# Patient Record
Sex: MALE | Race: WHITE
[De-identification: names, ages, dates, MRNs, and addresses within clinical notes are randomized per-mention and may not be internally consistent; named-entity substitution may affect disease eponyms.]

---

## 2019-04-04 ENCOUNTER — APPOINTMENT (OUTPATIENT)
Dept: INTERNAL MEDICINE | Facility: CLINIC | Age: 19
End: 2019-04-04
Payer: COMMERCIAL

## 2019-04-04 VITALS
TEMPERATURE: 98.8 F | SYSTOLIC BLOOD PRESSURE: 124 MMHG | WEIGHT: 181 LBS | BODY MASS INDEX: 30.16 KG/M2 | HEIGHT: 65 IN | DIASTOLIC BLOOD PRESSURE: 70 MMHG | HEART RATE: 72 BPM

## 2019-04-04 DIAGNOSIS — Z00.00 ENCOUNTER FOR GENERAL ADULT MEDICAL EXAMINATION W/OUT ABNORMAL FINDINGS: ICD-10-CM

## 2019-04-04 DIAGNOSIS — Z78.9 OTHER SPECIFIED HEALTH STATUS: ICD-10-CM

## 2019-04-04 PROCEDURE — 99385 PREV VISIT NEW AGE 18-39: CPT | Mod: 25

## 2019-04-04 PROCEDURE — 36415 COLL VENOUS BLD VENIPUNCTURE: CPT

## 2019-04-04 NOTE — HEALTH RISK ASSESSMENT
[Very Good] : ~his/her~  mood as very good [0] : 2) Feeling down, depressed, or hopeless: Not at all (0) [HIV test declined] : HIV test declined [With Family] : lives with family [Student] : student [College] : College [Single] : single [Feels Safe at Home] : Feels safe at home [] : No [[id number]] : 0 [Sexually Active] : not sexually active [High Risk Behavior] : no high risk behavior

## 2019-04-08 LAB
25(OH)D3 SERPL-MCNC: 17.6 NG/ML
ALBUMIN SERPL ELPH-MCNC: 5.2 G/DL
ALP BLD-CCNC: 73 U/L
ALT SERPL-CCNC: 24 U/L
ANION GAP SERPL CALC-SCNC: 16 MMOL/L
APPEARANCE: CLEAR
AST SERPL-CCNC: 19 U/L
BASOPHILS # BLD AUTO: 0.04 K/UL
BASOPHILS NFR BLD AUTO: 0.8 %
BILIRUB SERPL-MCNC: 0.6 MG/DL
BILIRUBIN URINE: NEGATIVE
BLOOD URINE: NEGATIVE
BUN SERPL-MCNC: 16 MG/DL
CALCIUM SERPL-MCNC: 10 MG/DL
CHLORIDE SERPL-SCNC: 103 MMOL/L
CHOLEST SERPL-MCNC: 175 MG/DL
CHOLEST/HDLC SERPL: 3.7 RATIO
CO2 SERPL-SCNC: 22 MMOL/L
COLOR: YELLOW
CREAT SERPL-MCNC: 0.83 MG/DL
EOSINOPHIL # BLD AUTO: 0.12 K/UL
EOSINOPHIL NFR BLD AUTO: 2.3 %
GLUCOSE QUALITATIVE U: NEGATIVE
GLUCOSE SERPL-MCNC: 98 MG/DL
HBV SURFACE AB SER QL: NONREACTIVE
HCT VFR BLD CALC: 50 %
HDLC SERPL-MCNC: 48 MG/DL
HGB BLD-MCNC: 15.2 G/DL
IMM GRANULOCYTES NFR BLD AUTO: 0.2 %
KETONES URINE: NEGATIVE
LDLC SERPL CALC-MCNC: 110 MG/DL
LEUKOCYTE ESTERASE URINE: NEGATIVE
LYMPHOCYTES # BLD AUTO: 2.03 K/UL
LYMPHOCYTES NFR BLD AUTO: 39.1 %
MAN DIFF?: NORMAL
MCHC RBC-ENTMCNC: 26 PG
MCHC RBC-ENTMCNC: 30.4 GM/DL
MCV RBC AUTO: 85.6 FL
MONOCYTES # BLD AUTO: 0.3 K/UL
MONOCYTES NFR BLD AUTO: 5.8 %
NEUTROPHILS # BLD AUTO: 2.69 K/UL
NEUTROPHILS NFR BLD AUTO: 51.8 %
NITRITE URINE: NEGATIVE
PH URINE: 6
PLATELET # BLD AUTO: 289 K/UL
POTASSIUM SERPL-SCNC: 4.1 MMOL/L
PROT SERPL-MCNC: 7.7 G/DL
PROTEIN URINE: NORMAL
RBC # BLD: 5.84 M/UL
RBC # FLD: 14.4 %
SODIUM SERPL-SCNC: 141 MMOL/L
SPECIFIC GRAVITY URINE: 1.03
T4 FREE SERPL-MCNC: 1.3 NG/DL
TRIGL SERPL-MCNC: 83 MG/DL
TSH SERPL-ACNC: 1.01 UIU/ML
UROBILINOGEN URINE: NORMAL
WBC # FLD AUTO: 5.19 K/UL

## 2019-04-16 ENCOUNTER — APPOINTMENT (OUTPATIENT)
Dept: PODIATRY | Facility: CLINIC | Age: 19
End: 2019-04-16
Payer: COMMERCIAL

## 2019-04-16 VITALS
WEIGHT: 181 LBS | DIASTOLIC BLOOD PRESSURE: 80 MMHG | SYSTOLIC BLOOD PRESSURE: 110 MMHG | HEIGHT: 65 IN | BODY MASS INDEX: 30.16 KG/M2

## 2019-04-16 PROCEDURE — 99203 OFFICE O/P NEW LOW 30 MIN: CPT | Mod: 25

## 2019-04-16 PROCEDURE — 17110 DESTRUCTION B9 LES UP TO 14: CPT

## 2019-04-16 NOTE — PROCEDURE
[FreeTextEntry1] : Plantar wart \par          Clinical Notes: I had a lengthy discussion with the patient today regarding the diagnosis, etiology, differential diagnosis and treatment options of the presenting problem. Risks alternatives and benefits of each treatment which ranged from conservative to surgical were explained in great detail. I also explained the progression of treatment from conservative to surgical and the benefits of each. I stressed conservative treatment (and explained them again) until it no longer provides relief. OTC products and meds discussed and reviewed. All questions asked and answered appropriately to the patient's satisfaction. , I had a lengthy discussion with the patient today regarding the diagnosis, etiology, differential diagnosis and treatment options of the presenting problem. Risks alternatives and benefits of each treatment which ranged from conservative to surgical were explained in great detail. I also explained the progression of treatment from conservative to surgical and the benefits of each. I stressed conservative treatment (and explained them again) until it no longer provides relief. OTC products and meds discussed and reviewed. All questions asked and answered appropriately to the patient's satisfaction., A lengthy informative and educational discussion discussed with the patient. Various treatment options were reviewed. I discussed simply shaving the lesion and applied Cantharone on a biweekly basis, discussed complete excision, another treatment option discussed with treatment the cryotherapy.\par  Cryotherapy as well as risks and benefits were reviewed with the patient and all questions asked and answered and I also reviewed hyfrecation of the lesion. Risks benefits and possible recurrence was stressed with the patient. After reviewing all types of procedures as well as the aftercare involved, the patient opted for Cryotherapy. \par   Procedures: \par        Utilizing the appropriate tip, and using an alternating freeze, law, freeze technique in a circular manner with the probe advancing and distracting from the lesion complete cryotherapy was performed. The patient tolerated the procedure well discharge instructions reviewed expectations also reviewed and the treatment completed successfully the patient left the office in stable and satisfactory condition. \par \par

## 2019-04-16 NOTE — HISTORY OF PRESENT ILLNESS
[FreeTextEntry1] : Location: plantar aspect of right heel\par Duration: 2 months\par Acute:yes\par Chronic: no\par Past Tx: nothing\par Exacerbated by standing and walking:\par

## 2019-04-16 NOTE — PHYSICAL EXAM
[General Appearance - Alert] : alert [General Appearance - In No Acute Distress] : in no acute distress [Abnormal Walk] : normal gait [Nail Clubbing] : no clubbing  or cyanosis of the fingernails [Musculoskeletal - Swelling] : no joint swelling seen [Motor Tone] : muscle strength and tone were normal [Oriented To Time, Place, And Person] : oriented to person, place, and time [Impaired Insight] : insight and judgment were intact [Affect] : the affect was normal [FreeTextEntry1] : The neurologic exam reveals gross sensorium to be intact, no sign of numbness tingling or sharp shooting pain. No evidence of neuropathy, or other positive neurologic findings

## 2019-04-16 NOTE — REVIEW OF SYSTEMS
[As Noted in HPI] : as noted in HPI [Skin Lesions] : skin lesion [An Unusual Growth] : an unusual growth on the skin [Negative] : Psychiatric [Skin Wound] : no skin wound [Itching] : no itching [Change In A Mole] : no change in a mole [Dry Skin] : no dry skin

## 2019-04-30 ENCOUNTER — APPOINTMENT (OUTPATIENT)
Dept: PODIATRY | Facility: CLINIC | Age: 19
End: 2019-04-30
Payer: COMMERCIAL

## 2019-04-30 VITALS
BODY MASS INDEX: 30.16 KG/M2 | DIASTOLIC BLOOD PRESSURE: 60 MMHG | SYSTOLIC BLOOD PRESSURE: 100 MMHG | WEIGHT: 181 LBS | HEIGHT: 65 IN

## 2019-04-30 PROCEDURE — 17110 DESTRUCTION B9 LES UP TO 14: CPT

## 2019-04-30 NOTE — PHYSICAL EXAM
[Full Pulse] : the pedal pulses are present [Edema] : there was no peripheral edema [FreeTextEntry1] : visual exam of the area exhibits interruption of skin lines and capillary bleeding, upon debridemment, and classic characteristics seen in a verruca, cauliflower-like appearance, pain on side to side compression\par

## 2019-04-30 NOTE — HISTORY OF PRESENT ILLNESS
[FreeTextEntry1] : Location: plantar aspect of right heel\par Duration: 2 months\par Past Tx: cryoprobe\par \par

## 2019-05-14 ENCOUNTER — APPOINTMENT (OUTPATIENT)
Dept: PODIATRY | Facility: CLINIC | Age: 19
End: 2019-05-14
Payer: COMMERCIAL

## 2019-05-14 VITALS
DIASTOLIC BLOOD PRESSURE: 60 MMHG | HEIGHT: 65 IN | WEIGHT: 181 LBS | BODY MASS INDEX: 30.16 KG/M2 | SYSTOLIC BLOOD PRESSURE: 110 MMHG

## 2019-05-14 PROCEDURE — 17110 DESTRUCTION B9 LES UP TO 14: CPT

## 2019-05-14 NOTE — PHYSICAL EXAM
[Edema] : there was no peripheral edema [Full Pulse] : the pedal pulses are present [FreeTextEntry1] : visual exam of the area exhibits interruption of skin lines and capillary bleeding, upon debridemment, and classic characteristics seen in a verruca, cauliflower-like appearance, pain on side to side compression\par

## 2019-05-14 NOTE — PROCEDURE
[FreeTextEntry1] :  Cryotherapy as well as risks and benefits were reviewed with the patient and all questions asked and answered and I also reviewed hyfrecation of the lesion. Risks benefits and possible recurrence was stressed with the patient. After reviewing all types of procedures as well as the aftercare involved, the patient opted for Cryotherapy. \par   Procedures: \par        Utilizing the appropriate tip, and using an alternating freeze, law, freeze technique in a circular manner with the probe advancing and distracting from the lesion complete cryotherapy was performed. The patient tolerated the procedure well discharge instructions reviewed expectations also reviewed and the treatment completed successfully the patient left the office in stable and satisfactory condition. \par \par \par \par \par \par

## 2019-05-28 ENCOUNTER — APPOINTMENT (OUTPATIENT)
Dept: PODIATRY | Facility: CLINIC | Age: 19
End: 2019-05-28
Payer: COMMERCIAL

## 2019-05-28 VITALS
WEIGHT: 181 LBS | DIASTOLIC BLOOD PRESSURE: 70 MMHG | SYSTOLIC BLOOD PRESSURE: 110 MMHG | HEIGHT: 65 IN | BODY MASS INDEX: 30.16 KG/M2

## 2019-05-28 PROCEDURE — 17110 DESTRUCTION B9 LES UP TO 14: CPT

## 2019-05-28 NOTE — PROCEDURE
[FreeTextEntry1] : Shaving of a benign lesion using a sterile scalpel the skin was debrided to capillary bleeding and Cantharone applied. A dry sterile bandage with discharge instructions was reviewed with the patient and a followup appointment given.. \par \par \par \par \par \par \par \par

## 2019-06-14 ENCOUNTER — APPOINTMENT (OUTPATIENT)
Dept: PODIATRY | Facility: CLINIC | Age: 19
End: 2019-06-14
Payer: COMMERCIAL

## 2019-06-14 VITALS
BODY MASS INDEX: 30.16 KG/M2 | SYSTOLIC BLOOD PRESSURE: 100 MMHG | DIASTOLIC BLOOD PRESSURE: 70 MMHG | WEIGHT: 181 LBS | HEIGHT: 65 IN

## 2019-06-14 PROCEDURE — 17110 DESTRUCTION B9 LES UP TO 14: CPT

## 2019-06-14 NOTE — PHYSICAL EXAM
[Full Pulse] : the pedal pulses are present [Edema] : there was no peripheral edema [FreeTextEntry1] : Upon sharp debridement of the area the verrucous cap has come off to reveal significant healing of the underlying tissue with only a small amount of capillary bleeding remaining

## 2019-06-14 NOTE — HISTORY OF PRESENT ILLNESS
[FreeTextEntry1] : Location: plantar aspect of right heel\par Duration: 2 months\par Past Tx: cryoprobe and cantharone\par \par

## 2019-06-28 ENCOUNTER — APPOINTMENT (OUTPATIENT)
Dept: PODIATRY | Facility: CLINIC | Age: 19
End: 2019-06-28
Payer: COMMERCIAL

## 2019-06-28 VITALS
SYSTOLIC BLOOD PRESSURE: 110 MMHG | BODY MASS INDEX: 30.16 KG/M2 | WEIGHT: 181 LBS | HEIGHT: 65 IN | DIASTOLIC BLOOD PRESSURE: 70 MMHG

## 2019-06-28 DIAGNOSIS — B07.0 PLANTAR WART: ICD-10-CM

## 2019-06-28 PROCEDURE — 99213 OFFICE O/P EST LOW 20 MIN: CPT

## 2019-06-28 NOTE — PROCEDURE
[FreeTextEntry1] : \par I had a lengthy discussion with the patient regarding the causes etiologies and treatment options for verruca plantaris. My discussion focused mainly on prevention since there is a high recurrence rate. My discussion included but was not limited to overall foot, the foot hygiene, the risks of walking barefoot especially in public places and the need to be conscious of the cleanliness of walking barefoot. All questions were asked and answered appropriately, and educational literature was supplied.\par \par \par \par \par \par \par \par

## 2019-06-28 NOTE — PHYSICAL EXAM
[General Appearance - Alert] : alert [General Appearance - In No Acute Distress] : in no acute distress [Full Pulse] : the pedal pulses are present [Edema] : there was no peripheral edema [FreeTextEntry1] : Upon sharp debridement of the area the verrucous cap has come off to reveal  good epithelium without skin lines. Appears healed at this time

## 2021-03-15 ENCOUNTER — APPOINTMENT (OUTPATIENT)
Dept: PODIATRY | Facility: CLINIC | Age: 21
End: 2021-03-15
Payer: COMMERCIAL

## 2021-03-15 VITALS — WEIGHT: 170 LBS | BODY MASS INDEX: 28.32 KG/M2 | HEIGHT: 65 IN

## 2021-03-15 DIAGNOSIS — L30.1 DYSHIDROSIS [POMPHOLYX]: ICD-10-CM

## 2021-03-15 DIAGNOSIS — L24.5 IRRITANT CONTACT DERMATITIS DUE TO OTHER CHEMICAL PRODUCTS: ICD-10-CM

## 2021-03-15 PROCEDURE — 99072 ADDL SUPL MATRL&STAF TM PHE: CPT

## 2021-03-15 PROCEDURE — 99213 OFFICE O/P EST LOW 20 MIN: CPT

## 2021-03-15 RX ORDER — AMMONIUM LACTATE 12 %
12 CREAM (GRAM) TOPICAL TWICE DAILY
Qty: 1 | Refills: 3 | Status: ACTIVE | COMMUNITY
Start: 2021-03-15 | End: 1900-01-01

## 2021-03-15 NOTE — PHYSICAL EXAM
[General Appearance - Alert] : alert [General Appearance - In No Acute Distress] : in no acute distress [No Joint Swelling] : no joint swelling [] : normal strength/tone [Normal Foot/Ankle] : Both lower extremities were exposed and visualized. Standing exam demonstrates normal foot posture and alignment. Hindfoot exam shows no hindfoot valgus or varus [Sensation] : the sensory exam was normal to light touch and pinprick [No Focal Deficits] : no focal deficits [Deep Tendon Reflexes (DTR)] : deep tendon reflexes were 2+ and symmetric [Motor Exam] : the motor exam was normal [Oriented To Time, Place, And Person] : oriented to person, place, and time [Impaired Insight] : insight and judgment were intact [Affect] : the affect was normal [FreeTextEntry3] : Vascular exam reveals palpable pedal pulses, the foot is warm to touch, there was good capillary fill time, the skin is normal in appearance there is no evidence of vascular disease or compromise at this time [FreeTextEntry1] : dry dishydrotic scaling rash right lateral malleolus

## 2021-03-15 NOTE — PROCEDURE
[FreeTextEntry1] : Based on my physical examination and my clinical findings and the patient's description of the symptoms, a complete differential diagnosis was reviewed with the patient. Possible diagnoses as well as treatment options explained in great detail. All questions asked and answered appropriately.\par

## 2021-03-15 NOTE — HISTORY OF PRESENT ILLNESS
[FreeTextEntry1] : Location-lateral malleolus right ankle\par Duration-a month\par Past tx-nothing\par

## 2024-10-28 NOTE — REVIEW OF SYSTEMS
Please use incentive spirometer per nursing instructions.  Rest and ice.  PCP follow-up.  Tylenol for pain.   [As Noted in HPI] : as noted in HPI [Negative] : Psychiatric

## 2025-02-20 NOTE — PROCEDURE
I have signed for the following orders AND/OR meds.  Please call the patient and ask the patient to schedule the testing AND/OR inform about any medications that were sent. Medications have been sent to pharmacy listed below      No orders of the defined types were placed in this encounter.      Medications Ordered This Encounter   Medications    valACYclovir (VALTREX) 500 MG tablet     Sig: Take 1 tablet (500 mg total) by mouth 2 (two) times daily.     Dispense:  60 tablet     Refill:  11     Take Valtrex 500 mg twice daily for 3 days as needed         [facility]/pharmacy #[id number] - [place], [place] - [address] [address]  Phone: [phone] Fax: [phone]    [facility] [address] [facility] [address] [place] [address]  Phone: [phone] Fax: [phone]    [id number] - [place] [address] AT [facility] [address] [place] [address]  [place] [address]  Phone: [phone] Fax: [phone]     [FreeTextEntry1] : Shaving of a benign lesion using a sterile scalpel the skin was debrided to capillary bleeding and Cantharone applied. A dry sterile bandage with discharge instructions was reviewed with the patient and a followup appointment given.. \par \par \par \par \par